# Patient Record
(demographics unavailable — no encounter records)

---

## 2024-12-02 NOTE — HEALTH RISK ASSESSMENT
[No] : No [0] : 2) Feeling down, depressed, or hopeless: Not at all (0) [de-identified] : None [de-identified] : None

## 2024-12-02 NOTE — HISTORY OF PRESENT ILLNESS
[FreeTextEntry8] : 17 yo F presenting today for sore throat, headaches, fever, neck pain and body aches. Initial symptoms started 11/22. She felt better then got worse this past Friday, day after Thanksgiving. Urgent care told her she was negative for COVID and Strep. Her mother thinks she could have mono.

## 2024-12-02 NOTE — PHYSICAL EXAM
[Ill-Appearing] : ill-appearing [Normal Sclera/Conjunctiva] : normal sclera/conjunctiva [Normal Outer Ear/Nose] : the outer ears and nose were normal in appearance [Normal Oropharynx] : the oropharynx was normal [No Respiratory Distress] : no respiratory distress  [No Accessory Muscle Use] : no accessory muscle use [No Focal Deficits] : no focal deficits [Normal Affect] : the affect was normal [Alert and Oriented x3] : oriented to person, place, and time [de-identified] : tender cervical lymph nodes